# Patient Record
Sex: MALE | Race: WHITE | NOT HISPANIC OR LATINO | Employment: OTHER | URBAN - METROPOLITAN AREA
[De-identification: names, ages, dates, MRNs, and addresses within clinical notes are randomized per-mention and may not be internally consistent; named-entity substitution may affect disease eponyms.]

---

## 2017-09-26 ENCOUNTER — OUTSIDE FACILITY SERVICE (OUTPATIENT)
Dept: GASTROENTEROLOGY | Facility: CLINIC | Age: 67
End: 2017-09-26

## 2017-09-26 PROCEDURE — G0105 COLORECTAL SCRN; HI RISK IND: HCPCS | Performed by: INTERNAL MEDICINE

## 2017-12-22 ENCOUNTER — TELEPHONE (OUTPATIENT)
Dept: GASTROENTEROLOGY | Facility: CLINIC | Age: 67
End: 2017-12-22

## 2017-12-22 NOTE — TELEPHONE ENCOUNTER
Beth,  Wife called and left message on voice mail stating that she had some questions concerning supplements that patient suppose to be on. I called patient's wife back and left message. Explaining that you and Dr Corral was out of the office until 12/27/17. I would leave you a message. Thanks      Please have him use Benefiber 2 teaspoons a day I personally don't like Metamucil either.    Dr. Corral

## 2017-12-27 NOTE — TELEPHONE ENCOUNTER
Wife would like to know if patient is suppose to take Metamucil? Wife states  does not like the taste and would like to know if there is something else.

## 2021-06-23 ENCOUNTER — LAB (OUTPATIENT)
Dept: LAB | Facility: HOSPITAL | Age: 71
End: 2021-06-23

## 2021-06-23 ENCOUNTER — CONSULT (OUTPATIENT)
Dept: ONCOLOGY | Facility: CLINIC | Age: 71
End: 2021-06-23

## 2021-06-23 VITALS
SYSTOLIC BLOOD PRESSURE: 118 MMHG | BODY MASS INDEX: 22.93 KG/M2 | DIASTOLIC BLOOD PRESSURE: 62 MMHG | RESPIRATION RATE: 16 BRPM | OXYGEN SATURATION: 96 % | HEIGHT: 71 IN | HEART RATE: 58 BPM | WEIGHT: 163.8 LBS | TEMPERATURE: 96.4 F

## 2021-06-23 DIAGNOSIS — D61.818 PANCYTOPENIA (HCC): Primary | ICD-10-CM

## 2021-06-23 DIAGNOSIS — D61.818 PANCYTOPENIA (HCC): ICD-10-CM

## 2021-06-23 LAB
CRP SERPL-MCNC: <0.3 MG/DL (ref 0–0.5)
ERYTHROCYTE [DISTWIDTH] IN BLOOD BY AUTOMATED COUNT: 13 % (ref 12.3–15.4)
ERYTHROCYTE [SEDIMENTATION RATE] IN BLOOD: <1 MM/HR (ref 0–20)
HCT VFR BLD AUTO: 45.4 % (ref 37.5–51)
HGB BLD-MCNC: 14.9 G/DL (ref 13–17.7)
LYMPHOCYTES # BLD AUTO: 1.5 10*3/MM3 (ref 0.7–3.1)
LYMPHOCYTES NFR BLD AUTO: 35.8 % (ref 19.6–45.3)
MCH RBC QN AUTO: 30 PG (ref 26.6–33)
MCHC RBC AUTO-ENTMCNC: 32.8 G/DL (ref 31.5–35.7)
MCV RBC AUTO: 91.5 FL (ref 79–97)
MONOCYTES # BLD AUTO: 0.2 10*3/MM3 (ref 0.1–0.9)
MONOCYTES NFR BLD AUTO: 4.8 % (ref 5–12)
NEUTROPHILS NFR BLD AUTO: 2.5 10*3/MM3 (ref 1.7–7)
NEUTROPHILS NFR BLD AUTO: 59.4 % (ref 42.7–76)
PLATELET # BLD AUTO: 126 10*3/MM3 (ref 140–450)
PMV BLD AUTO: 7.1 FL (ref 6–12)
RBC # BLD AUTO: 4.96 10*6/MM3 (ref 4.14–5.8)
WBC # BLD AUTO: 4.2 10*3/MM3 (ref 3.4–10.8)

## 2021-06-23 PROCEDURE — 85652 RBC SED RATE AUTOMATED: CPT

## 2021-06-23 PROCEDURE — 36415 COLL VENOUS BLD VENIPUNCTURE: CPT

## 2021-06-23 PROCEDURE — 99203 OFFICE O/P NEW LOW 30 MIN: CPT | Performed by: INTERNAL MEDICINE

## 2021-06-23 PROCEDURE — 86140 C-REACTIVE PROTEIN: CPT

## 2021-06-23 PROCEDURE — 86038 ANTINUCLEAR ANTIBODIES: CPT

## 2021-06-23 PROCEDURE — 85025 COMPLETE CBC W/AUTO DIFF WBC: CPT

## 2021-06-23 RX ORDER — PRAVASTATIN SODIUM 40 MG
TABLET ORAL
COMMUNITY
Start: 2021-03-02

## 2021-06-23 RX ORDER — DIPHENOXYLATE HYDROCHLORIDE AND ATROPINE SULFATE 2.5; .025 MG/1; MG/1
TABLET ORAL
COMMUNITY

## 2021-06-23 RX ORDER — LISINOPRIL AND HYDROCHLOROTHIAZIDE 20; 12.5 MG/1; MG/1
TABLET ORAL
COMMUNITY
Start: 2021-05-14

## 2021-06-23 NOTE — PROGRESS NOTES
ID: 70 y.o. year old male from [place] [id number]    PCP: Taz Kowalski MD    REFERRING PHYSICIAN: Taz Kowalski MD    Reason for Consultation: Thrombocytopenia    Dear Dr. Kowalski    It is a pleasure to meet Mr. Dia today.  He is a very pleasant 70-year-old gentleman in [facility] who presents today for consultation due to mild thrombocytopenia.  He reports that he has had this issue for a number of years.  He denies any recent changes in medicines.  Denies any bruising or bleeding.  No issues with infections.  No underlying autoimmune disorders.      Past Medical History:   Diagnosis Date   • Hypertension        Past Surgical History:   Procedure Laterality Date   • TONSILLECTOMY         Social History     Socioeconomic History   • Marital status:      Spouse name: Not on file   • Number of children: Not on file   • Years of education: Not on file   • Highest education level: Not on file   Tobacco Use   • Smoking status: Former Smoker     Packs/day: 3.00     Types: Cigarettes     Quit date:      Years since quittin.4   • Smokeless tobacco: Never Used   Substance and Sexual Activity   • Alcohol use: Yes     Alcohol/week: 10.0 standard drinks     Types: 10 Glasses of wine per week   • Drug use: Never       Family History   Problem Relation Age of Onset   • Diabetes Father    • Heart disease Father        Review of Systems:    16 point review of systems was performed and reviewed and scanned into the EMR    Review of Systems - Oncology      Current Outpatient Medications:   •  aspirin 81 MG oral suspension, , Disp: , Rfl:   •  lisinopril-hydrochlorothiazide (PRINZIDE,ZESTORETIC) 20-12.5 MG per tablet, Take  by mouth., Disp: , Rfl:   •  multivitamin (Multi-Vitamin Daily) tablet tablet, , Disp: , Rfl:   •  pravastatin (PRAVACHOL) 40 MG tablet, Take  by mouth., Disp: , Rfl:     Pain Medications             aspirin 81 MG oral suspension            Allergies   Allergen Reactions   •  Penicillins Unknown - Low Severity       ECOG SCORE: 0              Objective     Vitals:    06/23/21 1519   BP: 118/62   Pulse: 58   Resp: 16   Temp: 96.4 °F (35.8 °C)   SpO2: 96%     Body mass index is 22.85 kg/m².  Body surface area is 1.94 meters squared.        06/23/21  1519   Weight: 74.3 kg (163 lb 12.8 oz)     Pain Score    06/23/21 1519   PainSc: 0-No pain          Physical Exam    General: well appearing, in no acute distress  HEENT: sclera anicteric, oropharynx clear, neck is supple  Lymphatics: no cervical, supraclavicular, or axillary adenopathy  Cardiovascular: regular rate and rhythm, no murmurs, rubs or gallops  Lungs: clear to auscultation bilaterally  Abdomen: soft, nontender, nondistended.  No palpable organomegaly  Extremities: no lower extremity edema  Skin: no rashes, lesions, bruising, or petechiae  Msk:  Shows no weakness of the large muscle groups  Psych: Mood is stable        No results found for: GLUCOSE, BUN, CREATININE, NA, K, CL, CO2, CALCIUM, PROTEINTOT, ALBUMIN, BILITOT, ALKPHOS, AST, ALT    Lab Results   Component Value Date    HGB 14.9 06/23/2021    HCT 45.4 06/23/2021    MCV 91.5 06/23/2021     (L) 06/23/2021    WBC 4.20 06/23/2021    NEUTROABS 2.50 06/23/2021    LYMPHSABS 1.50 06/23/2021    MONOSABS 0.20 06/23/2021       No Images in the past 120 days found..      Assessment/Plan      1.  Mild thrombocytopenia.  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  I do not feel that he needs any significant work-up at this time.  I will send an YVONNE titer and ESR and CRP.  Does not appear that he has any underlying liver disorders.  At this point he can be checked twice a year and if he drops below 75 he can come back and see me.  He had mildly depressed white blood cell count on his previous CBC.  That seems to have corrected.        Thank you for allowing me to participate in the care of this patient.    Yours sincerely,    Shanthi Bravo MD  [facility]  [facility]  Hematology and Oncology         Orders Placed This Encounter   Procedures   • YVONNE     Standing Status:   Future     Number of Occurrences:   1     Standing Expiration Date:   6/23/2022     Order Specific Question:   Release to patient     Answer:   Immediate   • Sedimentation Rate     Standing Status:   Future     Number of Occurrences:   1     Standing Expiration Date:   6/23/2022     Order Specific Question:   Release to patient     Answer:   Immediate   • C-reactive Protein     Standing Status:   Future     Number of Occurrences:   1     Standing Expiration Date:   6/23/2022     Order Specific Question:   Release to patient     Answer:   Immediate   • CBC & Differential     Standing Status:   Future     Number of Occurrences:   1     Standing Expiration Date:   6/23/2022     Order Specific Question:   Manual Differential     Answer:   No     Order Specific Question:   Release to patient     Answer:   Immediate

## 2021-06-24 LAB — ANA SER QL: NEGATIVE

## 2022-09-15 DIAGNOSIS — Z12.11 SCREENING FOR COLON CANCER: Primary | ICD-10-CM

## 2022-10-03 ENCOUNTER — OUTSIDE FACILITY SERVICE (OUTPATIENT)
Dept: GASTROENTEROLOGY | Facility: CLINIC | Age: 72
End: 2022-10-03

## 2022-10-03 ENCOUNTER — TELEPHONE (OUTPATIENT)
Dept: GASTROENTEROLOGY | Facility: CLINIC | Age: 72
End: 2022-10-03

## 2022-10-03 PROCEDURE — G0500 MOD SEDAT ENDO SERVICE >5YRS: HCPCS | Performed by: INTERNAL MEDICINE

## 2022-10-03 PROCEDURE — G0105 COLORECTAL SCRN; HI RISK IND: HCPCS | Performed by: INTERNAL MEDICINE

## 2022-10-03 NOTE — TELEPHONE ENCOUNTER
I called and spoke to Blue. His sigmoid goes up to his RUQ and he has had a BE confirm this. His cecum was reached in 2012 and 2017 hepatic flexure today. The original polyp was over 15 years ago and I told him he could follow up on a PRN basis.    Dr. Corral